# Patient Record
Sex: FEMALE | Race: WHITE | NOT HISPANIC OR LATINO | Employment: PART TIME | URBAN - METROPOLITAN AREA
[De-identification: names, ages, dates, MRNs, and addresses within clinical notes are randomized per-mention and may not be internally consistent; named-entity substitution may affect disease eponyms.]

---

## 2021-04-29 ENCOUNTER — OFFICE VISIT (OUTPATIENT)
Dept: URGENT CARE | Facility: URGENT CARE | Age: 36
End: 2021-04-29
Payer: COMMERCIAL

## 2021-04-29 VITALS
TEMPERATURE: 99.5 F | WEIGHT: 158 LBS | DIASTOLIC BLOOD PRESSURE: 78 MMHG | RESPIRATION RATE: 16 BRPM | OXYGEN SATURATION: 97 % | SYSTOLIC BLOOD PRESSURE: 122 MMHG | BODY MASS INDEX: 24.75 KG/M2 | HEART RATE: 88 BPM
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
## 2021-04-29 DIAGNOSIS — Z23 ENCOUNTER FOR IMMUNIZATION: ICD-10-CM

## 2021-04-29 DIAGNOSIS — S01.81XA FACIAL LACERATION, INITIAL ENCOUNTER: ICD-10-CM

## 2021-04-29 PROCEDURE — 90471 IMMUNIZATION ADMIN: CPT

## 2021-04-29 PROCEDURE — 12011 RPR F/E/E/N/L/M 2.5 CM/<: CPT

## 2021-04-29 PROCEDURE — 90715 TDAP VACCINE 7 YRS/> IM: CPT

## 2021-04-29 NOTE — LETTER
April 29, 2021      Shannan Vasquez Katy  [address]  [place] [address]        To Whom It May Concern:    Shannan VasquezKaty  was seen on 4/29/2021.  Please excuse her on 4/29/21 due to injury.        Sincerely,        Dr. Uche Root  Provider

## 2021-04-29 NOTE — PROGRESS NOTES
SUBJECTIVE:   36 year old female sustained laceration of face 1 hours ago. Nature of injury: bit by her dog. Tetanus vaccination status reviewed: Tdap vaccination indicated and given today.    Rabies utd.     OBJECTIVE:   Patient appears well, vitals are normal. Laceration 1 cm noted. Curved lesion about 2cm in size with the lower part gaping to sub q fat and the upper pat very superficial. 2 other smaller lesion appear well approximated. These are about 0.5cm and about 4mm.    ASSESSMENT:   Laceration as described.    PLAN:   Anesthesia with 1% Lidocaine with Epinephrine. Wound cleansed, debrided of visible foreign material and necrotic tissue, and sutured with 6 6.0 suturues. . Antibiotic ointment and dressing applied.  Wound care instructions provided.  Observe for any signs of infection or other problems.  Return for suture removal in 5 days.     Given it was a dog bite and we sutured this up we did provider 5 days of oral antibiotics.

## 2021-05-01 ENCOUNTER — OFFICE VISIT (OUTPATIENT)
Dept: URGENT CARE | Facility: URGENT CARE | Age: 36
End: 2021-05-01
Payer: COMMERCIAL

## 2021-05-01 VITALS
SYSTOLIC BLOOD PRESSURE: 141 MMHG | HEART RATE: 84 BPM | OXYGEN SATURATION: 96 % | DIASTOLIC BLOOD PRESSURE: 84 MMHG | TEMPERATURE: 98.9 F | RESPIRATION RATE: 16 BRPM
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
## 2021-05-01 DIAGNOSIS — S01.85XD DOG BITE OF FACE, SUBSEQUENT ENCOUNTER: Primary | ICD-10-CM

## 2021-05-01 PROCEDURE — 99212 OFFICE O/P EST SF 10 MIN: CPT | Performed by: PHYSICIAN ASSISTANT

## 2021-05-01 NOTE — LETTER
[facility] [address] [place] [address]  Phone: [phone]    May 1, 2021        Shannan Burns  [address] [place] [address]          To whom it may concern:    RE: Shannan Burns    Patient was seen and treated today at our clinic. Please excuse absences on 5/1, 5/2, 5/3/21.    Please contact me for questions or concerns.      Sincerely,        Taiwo Christianson PA-C

## 2021-05-01 NOTE — LETTER
[facility] [address] [place] [address]  Phone: [phone]    May 1, 2021        Shannan Burns  [address] [place] [address]          To whom it may concern:    RE: Shannan Burns    Patient was seen and treated today at our clinic.  Please excuse absences on 5/1, 5/2/21.    Please contact me for questions or concerns.      Sincerely,        Taiwo Christianson PA-C

## 2021-05-01 NOTE — PROGRESS NOTES
SUBJECTIVE:  Shannan Burns is a 36 year old female who presents to the clinic today for persistent swelling of cheek despite 36 hours AB.  Symptoms have improved mildly, no worsening.    No past medical history on file.  Current Outpatient Medications   Medication Sig Dispense Refill     amoxicillin-clavulanate (AUGMENTIN) 875-125 MG tablet Take 1 tablet by mouth 2 times daily 10 tablet 0     Social History     Tobacco Use     Smoking status: Never Smoker     Smokeless tobacco: Never Used   Substance Use Topics     Alcohol use: Yes     Comment: socially       ROS:  10 point ROS negative except as listed above      EXAM:   BP (!) 141/84   Pulse 84   Temp 98.9  F (37.2  C) (Tympanic)   Resp 16   LMP 04/14/2021 (Approximate)   SpO2 96%   GENERAL: alert, no acute distress.  SKIN: mild swelling and tenderness in cheek area.  Sutures look to be healing without infection.  EYES: , conjunctiva clear  NECK: supple, non-tender to palpation, no adenopathy noted  RESP: lungs clear to auscultation - no rales, rhonchi or wheezes  CV: regular rates and rhythm, normal S1 S2, no murmur noted  NEURO: Normal strength and tone, sensory exam grossly normal,  normal speech and mentation    ASSESSMENT:  (S01.85XD,  W54.0XXD) Dog bite of face, subsequent encounter  (primary encounter diagnosis)  Comment:  Plan: Given <48 hours AB, home monitoring acceptable. advised close follow up with any worsening of symptoms    There are no Patient Instructions on file for this visit.

## 2021-05-04 ENCOUNTER — ALLIED HEALTH/NURSE VISIT (OUTPATIENT)
Dept: NURSING | Facility: CLINIC | Age: 36
End: 2021-05-04
Payer: COMMERCIAL

## 2021-05-04 DIAGNOSIS — Z48.02 ENCOUNTER FOR REMOVAL OF SUTURES: Primary | ICD-10-CM

## 2021-05-04 DIAGNOSIS — Z48.02 VISIT FOR SUTURE REMOVAL: ICD-10-CM

## 2021-05-04 PROCEDURE — 99207 PR NO CHARGE NURSE ONLY: CPT

## 2021-05-04 NOTE — NURSING NOTE
Shannan Burns presents to the clinic for removal of sutures and sutures,staples, steri strips. The patient has had sutures in place for 5 days. There has been no patient reported signs or symptoms of infection or drainage. 6  sutures and sutures,staples, staple, steri strips are seen and located on the left cheek by mouth. Tetanus status is up to date. All sutures and sutures,staples, steri strips were easily removed today. No pain to area. Pt finished 5 day course of antibiotic last night. Cheek and jaw is still slightly puffy. I did have Dr. Joseph assess. Ok to remove suture. Well approximated healing of bite. I did place 3 steristrips to site. Routine wound care discussed by the RN or provider. The patient will follow up as needed.  BALTAZAR Bright

## 2021-05-31 ENCOUNTER — RECORDS - HEALTHEAST (OUTPATIENT)
Dept: ADMINISTRATIVE | Facility: CLINIC | Age: 36
End: 2021-05-31

## 2022-07-28 ENCOUNTER — OFFICE VISIT (OUTPATIENT)
Dept: OPHTHALMOLOGY | Facility: CLINIC | Age: 37
End: 2022-07-28
Attending: OPHTHALMOLOGY
Payer: COMMERCIAL

## 2022-07-28 DIAGNOSIS — H50.012 MONOCULAR ESOTROPIA OF LEFT EYE: Primary | ICD-10-CM

## 2022-07-28 DIAGNOSIS — H53.002 AMBLYOPIA, LEFT EYE: ICD-10-CM

## 2022-07-28 DIAGNOSIS — H53.2 DIPLOPIA: ICD-10-CM

## 2022-07-28 PROCEDURE — 92060 SENSORIMOTOR EXAMINATION: CPT | Performed by: OPHTHALMOLOGY

## 2022-07-28 PROCEDURE — G0463 HOSPITAL OUTPT CLINIC VISIT: HCPCS | Mod: 25 | Performed by: TECHNICIAN/TECHNOLOGIST

## 2022-07-28 PROCEDURE — 92015 DETERMINE REFRACTIVE STATE: CPT | Performed by: TECHNICIAN/TECHNOLOGIST

## 2022-07-28 PROCEDURE — 92004 COMPRE OPH EXAM NEW PT 1/>: CPT | Performed by: OPHTHALMOLOGY

## 2022-07-28 ASSESSMENT — CONF VISUAL FIELD
OD_NORMAL: 1
METHOD: TOYS
OS_NORMAL: 1

## 2022-07-28 ASSESSMENT — REFRACTION
OS_CYLINDER: +0.25
OS_AXIS: 180
OD_SPHERE: +0.50
OD_CYLINDER: SPHERE
OS_SPHERE: +0.75

## 2022-07-28 ASSESSMENT — VISUAL ACUITY
OS_SC+: -2
OS_SC: 20/50
METHOD: SNELLEN - LINEAR
OD_SC: 20/20

## 2022-07-28 ASSESSMENT — TONOMETRY
IOP_METHOD: SINGLE ICARE
OD_IOP_MMHG: 17
OS_IOP_MMHG: 20

## 2022-07-28 NOTE — NURSING NOTE
"Chief Complaint(s) and History of Present Illness(es)     Esotropia Evaluation     Laterality: left eye    Onset: present since childhood    Frequency: constantly    Treatments tried: glasses              Comments     H/o \"left lazy eye\" since childhood, tried glasses in childhood but did not help, blurred VA in LE, no h/o patching, unsure if LET in childhood, no h/o strab sx, notes in the past few weeks experiencing double vision and LET, notes diplopia most of the time, worse outside, wearing blue light glasses to try to help with comfort    Referred from Dr. Garcia in June 2022  Inf patient                  "

## 2022-07-29 ASSESSMENT — EXTERNAL EXAM - RIGHT EYE: OD_EXAM: NORMAL

## 2022-07-29 ASSESSMENT — SLIT LAMP EXAM - LIDS
COMMENTS: NORMAL
COMMENTS: NORMAL

## 2022-07-29 ASSESSMENT — CUP TO DISC RATIO
OD_RATIO: 0.3
OS_RATIO: 0.3

## 2022-07-29 ASSESSMENT — EXTERNAL EXAM - LEFT EYE: OS_EXAM: NORMAL

## 2022-07-30 NOTE — PROGRESS NOTES
"Chief Complaints and History of Present Illnesses   Patient presents with     Esotropia Evaluation   Review of systems for the eyes was negative other than the pertinent positives and negatives noted in the HPI.  History is obtained from the patient     Referring provider: Mar Garcia     Primary care: Yazmin Jarquin   Assessment   Shannan Burns is a 37 year old female who presents with:       ICD-10-CM    1. Monocular esotropia of left eye  H50.012 Sensorimotor   2. Amblyopia, left eye  H53.002          Plan  Ms. Burns has slowly increasing esotropia since childhood and now has bothersome diplopia (likely out of suppression scotoma)  She has mild amblyopia left eye.  I recommend eye muscle surgery. Today with Shannan,  I reviewed the indications, risks, benefits, and alternatives of eye muscle surgery including, but not limited to, failure obtain the desired ocular alignment (\"over\" or \"under\" correction), diplopia, and damage to any structure in or around the eye that may necessitate treatment with medicine, laser, or surgery. I further explained that the goal of surgery is to help control Shannan's strabismus. Surgery will not \"cure\" Shannan's strabismus or resolve/prevent the need for refractive correction. Additional strabismus surgery may be required in the short or long term. I emphasized that regular follow-up to monitor and optimize her vision and alignment would be necessary. We also discussed the risks of surgical injury, bleeding, and infection which may necessitate further medical or surgical treatment and which may result in diplopia, loss of vision, blindness, or loss of the eye(s) in less than 1% of cases and the remote possibility of permanent damage to any organ system or death with the use of general anesthesia.  I explained that we would hide visible scars as much as possible in natural creases but that every patient heals and pigments differently resulting in a variable degree of scarring " "to the eyes or surrounding facial structures after surgery.  I provided multiple opportunities for questions, answered all questions to the best of my ability, and confirmed that my answers and my discussion were understood.       Further details of the management plan can be found in the \"Patient Instructions\" section which was printed and given to the patient at checkout.  No follow-ups on file.   Attending Physician Attestation:  Complete documentation of historical and exam elements from today's encounter can be found in the full encounter summary report (not reduplicated in this progress note).  I personally obtained the chief complaint(s) and history of present illness.  I confirmed and edited as necessary the review of systems, past medical/surgical history, family history, social history, and examination findings as documented by others; and I examined the patient myself.  I personally reviewed the relevant tests, images, and reports as documented above.  I formulated and edited as necessary the assessment and plan and discussed the findings and management plan with the patient and family. - Kourtney Amor MD 7/29/2022 11:41 PM        "

## 2022-08-01 ENCOUNTER — TELEPHONE (OUTPATIENT)
Dept: OPHTHALMOLOGY | Facility: CLINIC | Age: 37
End: 2022-08-01

## 2022-09-07 ENCOUNTER — OFFICE VISIT (OUTPATIENT)
Dept: OPHTHALMOLOGY | Facility: CLINIC | Age: 37
End: 2022-09-07
Attending: OPHTHALMOLOGY
Payer: COMMERCIAL

## 2022-09-07 ENCOUNTER — OFFICE VISIT (OUTPATIENT)
Dept: FAMILY MEDICINE | Facility: CLINIC | Age: 37
End: 2022-09-07

## 2022-09-07 VITALS
RESPIRATION RATE: 16 BRPM | TEMPERATURE: 98.3 F | HEART RATE: 77 BPM | DIASTOLIC BLOOD PRESSURE: 84 MMHG | BODY MASS INDEX: 27.25 KG/M2 | OXYGEN SATURATION: 94 % | SYSTOLIC BLOOD PRESSURE: 136 MMHG | WEIGHT: 174 LBS

## 2022-09-07 DIAGNOSIS — H53.2 DIPLOPIA: ICD-10-CM

## 2022-09-07 DIAGNOSIS — Z01.818 PREOP GENERAL PHYSICAL EXAM: Primary | ICD-10-CM

## 2022-09-07 DIAGNOSIS — H50.012 MONOCULAR ESOTROPIA OF LEFT EYE: ICD-10-CM

## 2022-09-07 DIAGNOSIS — H50.012 MONOCULAR ESOTROPIA OF LEFT EYE: Primary | ICD-10-CM

## 2022-09-07 PROCEDURE — 99207 SENSORIMOTOR: CPT | Mod: 26 | Performed by: OPHTHALMOLOGY

## 2022-09-07 PROCEDURE — 99214 OFFICE O/P EST MOD 30 MIN: CPT | Performed by: NURSE PRACTITIONER

## 2022-09-07 PROCEDURE — 92285 EXTERNAL OCULAR PHOTOGRAPHY: CPT

## 2022-09-07 PROCEDURE — 92060 SENSORIMOTOR EXAMINATION: CPT

## 2022-09-07 ASSESSMENT — VISUAL ACUITY
OS_SC+: +2
OS_SC: 20/60
OD_SC+: -1
METHOD: SNELLEN - LINEAR
OD_SC: 20/20

## 2022-09-07 ASSESSMENT — CONF VISUAL FIELD
OD_NORMAL: 1
OS_NORMAL: 1

## 2022-09-07 NOTE — PROGRESS NOTES
Chief Complaint(s) & History of Present Illness  Chief Complaint(s) and History of Present Illness(es)     Esotropia Follow Up     Laterality: left eye    Onset: present since childhood    Associated symptoms: Negative for droopy eyelid, unequal pupil size and headaches    Treatments tried: glasses and patching    Comments: Double vision has gotten better since lv. Still very light sens. VA stable. Still noticing crossing of the left eye.               Comments     Inf: pt              Inf: patient       Assessment and Plan:      Shannan Burns is a 37 year old female who presents with:     Monocular esotropia of left eye  Better by examination and history. Shannan describes bright lights as being bothersome and triggering diplopia. Feels like lights have been bothering her way more now than in the past. Diplopia was happening 90% of time around last visit's time, now it happens around 40% of time.  - Sensorimotor  - External Photos [employer]       PLAN:  Will discuss with Dr Amor

## 2022-09-07 NOTE — PATIENT INSTRUCTIONS
Preparing for Your Surgery  Getting started  A nurse will call you to review your health history and instructions. They will give you an arrival time based on your scheduled surgery time. Please be ready to share:  Your doctor's clinic name and phone number  Your medical, surgical and anesthesia history  A list of allergies and sensitivities  A list of medicines, including herbal treatments and over-the-counter drugs  Whether the patient has a legal guardian (ask how to send us the papers in advance)  Please tell us if you're pregnant--or if there's any chance you might be pregnant. Some surgeries may injure a fetus (unborn baby), so they require a pregnancy test. Surgeries that are safe for a fetus don't always need a test, and you can choose whether to have one.   If you have a child who's having surgery, please ask for a copy of Preparing for Your Child's Surgery.    Preparing for surgery  Within 30 days of surgery: Have a pre-op exam (sometimes called an H&P, or History and Physical). This can be done at a clinic or pre-operative center.  If you're having a , you may not need this exam. Talk to your care team.  At your pre-op exam, talk to your care team about all medicines you take. If you need to stop any medicines before surgery, ask when to start taking them again.  We do this for your safety. Many medicines can make you bleed too much during surgery. Some change how well surgery (anesthesia) drugs work.  Call your insurance company to let them know you're having surgery. (If you don't have insurance, call [phone].)  Call your clinic if there's any change in your health. This includes signs of a cold or flu (sore throat, runny nose, cough, rash, fever). It also includes a scrape or scratch near the surgery site.  If you have questions on the day of surgery, call your hospital or surgery center.  COVID testing  You may need to be tested for COVID-19 before having surgery. If so, we will give  you instructions.  Eating and drinking guidelines  For your safety: Unless your surgeon tells you otherwise, follow the guidelines below.  Eat and drink as usual until 8 hours before surgery. After that, no food or milk.  Drink clear liquids until 2 hours before surgery. These are liquids you can see through, like water, Gatorade and Propel Water. You may also have black coffee and tea (no cream or milk).  Nothing by mouth within 2 hours of surgery. This includes gum, candy and breath mints.  If you drink alcohol: Stop drinking it the night before surgery.  If your care team tells you to take medicine on the morning of surgery, it's okay to take it with a sip of water.  Preventing infection  Shower or bathe the night before and morning of your surgery. Follow the instructions your clinic gave you. (If no instructions, use regular soap.)  Don't shave or clip hair near your surgery site. We'll remove the hair if needed.  Don't smoke or vape the morning of surgery. You may chew nicotine gum up to 2 hours before surgery. A nicotine patch is okay.  Note: Some surgeries require you to completely quit smoking and nicotine. Check with your surgeon.  Your care team will make every effort to keep you safe from infection. We will:  Clean our hands often with soap and water (or an alcohol-based hand rub).  Clean the skin at your surgery site with a special soap that kills germs.  Give you a special gown to keep you warm. (Cold raises the risk of infection.)  Wear special hair covers, masks, gowns and gloves during surgery.  Give antibiotic medicine, if prescribed. Not all surgeries need antibiotics.  What to bring on the day of surgery  Photo ID and insurance card  Copy of your health care directive, if you have one  Glasses and hearing aides (bring cases)  You can't wear contacts during surgery  Inhaler and eye drops, if you use them (tell us about these when you arrive)  CPAP machine or breathing device, if you use them  A  few personal items, if spending the night  If you have . . .  A pacemaker, ICD (cardiac defibrillator) or other implant: Bring the ID card.  An implanted stimulator: Bring the remote control.  A legal guardian: Bring a copy of the certified (court-stamped) guardianship papers.  Please remove any jewelry, including body piercings. Leave jewelry and other valuables at home.  If you're going home the day of surgery  You must have a responsible adult drive you home. They should stay with you overnight as well.  If you don't have someone to stay with you, and you aren't safe to go home alone, we may keep you overnight. Insurance often won't pay for this.  After surgery  If it's hard to control your pain or you need more pain medicine, please call your surgeon's office.  Questions?   If you have any questions for your care team, list them here: _________________________________________________________________________________________________________________________________________________________________________ ____________________________________ ____________________________________ ____________________________________  For informational purposes only. Not to replace the advice of your health care provider. Copyright   2003, 2019 [employer]. All rights reserved. Clinically reviewed by Marine Ojeda MD. [employer] - SHV [id number] - REV 07/21.    Preparing for Your Surgery  Getting started  A nurse will call you to review your health history and instructions. They will give you an arrival time based on your scheduled surgery time. Please be ready to share:  Your doctor's clinic name and phone number  Your medical, surgical and anesthesia history  A list of allergies and sensitivities  A list of medicines, including herbal treatments and over-the-counter drugs  Whether the patient has a legal guardian (ask how to send us the papers in advance)  Please tell us if you're pregnant--or if there's any chance you might be  pregnant. Some surgeries may injure a fetus (unborn baby), so they require a pregnancy test. Surgeries that are safe for a fetus don't always need a test, and you can choose whether to have one.   If you have a child who's having surgery, please ask for a copy of Preparing for Your Child's Surgery.    Preparing for surgery  Within 30 days of surgery: Have a pre-op exam (sometimes called an H&P, or History and Physical). This can be done at a clinic or pre-operative center.  If you're having a , you may not need this exam. Talk to your care team.  At your pre-op exam, talk to your care team about all medicines you take. If you need to stop any medicines before surgery, ask when to start taking them again.  We do this for your safety. Many medicines can make you bleed too much during surgery. Some change how well surgery (anesthesia) drugs work.  Call your insurance company to let them know you're having surgery. (If you don't have insurance, call [phone].)  Call your clinic if there's any change in your health. This includes signs of a cold or flu (sore throat, runny nose, cough, rash, fever). It also includes a scrape or scratch near the surgery site.  If you have questions on the day of surgery, call your hospital or surgery center.  COVID testing  You may need to be tested for COVID-19 before having surgery. If so, we will give you instructions.  Eating and drinking guidelines  For your safety: Unless your surgeon tells you otherwise, follow the guidelines below.  Eat and drink as usual until 8 hours before surgery. After that, no food or milk.  Drink clear liquids until 2 hours before surgery. These are liquids you can see through, like water, Gatorade and Propel Water. You may also have black coffee and tea (no cream or milk).  Nothing by mouth within 2 hours of surgery. This includes gum, candy and breath mints.  If you drink alcohol: Stop drinking it the night before surgery.  If your care team  tells you to take medicine on the morning of surgery, it's okay to take it with a sip of water.  Preventing infection  Shower or bathe the night before and morning of your surgery. Follow the instructions your clinic gave you. (If no instructions, use regular soap.)  Don't shave or clip hair near your surgery site. We'll remove the hair if needed.  Don't smoke or vape the morning of surgery. You may chew nicotine gum up to 2 hours before surgery. A nicotine patch is okay.  Note: Some surgeries require you to completely quit smoking and nicotine. Check with your surgeon.  Your care team will make every effort to keep you safe from infection. We will:  Clean our hands often with soap and water (or an alcohol-based hand rub).  Clean the skin at your surgery site with a special soap that kills germs.  Give you a special gown to keep you warm. (Cold raises the risk of infection.)  Wear special hair covers, masks, gowns and gloves during surgery.  Give antibiotic medicine, if prescribed. Not all surgeries need antibiotics.  What to bring on the day of surgery  Photo ID and insurance card  Copy of your health care directive, if you have one  Glasses and hearing aides (bring cases)  You can't wear contacts during surgery  Inhaler and eye drops, if you use them (tell us about these when you arrive)  CPAP machine or breathing device, if you use them  A few personal items, if spending the night  If you have . . .  A pacemaker, ICD (cardiac defibrillator) or other implant: Bring the ID card.  An implanted stimulator: Bring the remote control.  A legal guardian: Bring a copy of the certified (court-stamped) guardianship papers.  Please remove any jewelry, including body piercings. Leave jewelry and other valuables at home.  If you're going home the day of surgery  You must have a responsible adult drive you home. They should stay with you overnight as well.  If you don't have someone to stay with you, and you aren't safe to go  home alone, we may keep you overnight. Insurance often won't pay for this.  After surgery  If it's hard to control your pain or you need more pain medicine, please call your surgeon's office.  Questions?   If you have any questions for your care team, list them here: _________________________________________________________________________________________________________________________________________________________________________ ____________________________________ ____________________________________ ____________________________________  For informational purposes only. Not to replace the advice of your health care provider. Copyright   2003, 2019 [facility]. All rights reserved. Clinically reviewed by Marine Ojeda MD. [employer] [id number] - REV 07/21.    How to Take Your Medication Before Surgery  - HOLD (do not take) no ibuprofen/aleve, advil or aspirin 1 week before surgery

## 2022-09-07 NOTE — NURSING NOTE
Chief Complaint(s) and History of Present Illness(es)     Esotropia Follow Up     Laterality: left eye    Onset: present since childhood    Associated symptoms: Negative for droopy eyelid, unequal pupil size and headaches    Treatments tried: glasses and patching    Comments: Double vision has gotten better since lv. Still very light sens. VA stable. Still noticing crossing of the left eye.               Comments     Inf: pt              
Pending TSH, Free T4, T3

## 2022-09-07 NOTE — PROGRESS NOTES
. [facility] [address] [place] [address]  Phone: [phone]  Primary Provider: Yazmin Jarquin  Pre-op Performing Provider: NICOLLE PHIPPS      PREOPERATIVE EVALUATION:  Today's date: 9/7/2022    Shannan Burns is a 37 year old female who presents for a preoperative evaluation.    Surgical Information:  Surgery/Procedure: SIMPLE RECESSION OR RESECTION, MUSCLE, EXTRAOCULAR, BILATERAL, FOR STRABISMUS CORRECTION  Surgery Location:  OR  Surgeon: Kourtney Amor MD  Surgery Date: 09/14/2022  Time of Surgery: 2:45 PM   Where patient plans to recover: At home with family  Fax number for surgical facility: Note does not need to be faxed, will be available electronically in Epic.    Type of Anesthesia Anticipated: General    Assessment & Plan     The proposed surgical procedure is considered LOW risk.    Preop general physical exam  preoperative examination wnl; no concerning health issues identified prior to surgery; reviewed cardiac risk, chronic health history, medications and discussed presurgical medication management. Patient deemed medically stable for planned surgery.  Plan: will check the following   - Basic metabolic panel  - CBC with platelets  - Asymptomatic COVID-19 Virus (Coronavirus) by PCR  - HCG qualitative urine    Monocular esotropia of left eye  Diplopia  Present since childhood w/o associated symptoms; failed conservative therapy include glasses and patching  - recommend surgical correction     Risks and Recommendations:  The patient has the following additional risks and recommendations for perioperative complications:   - No identified additional risk factors other than previously addressed    Medication Instructions:  Patient is on no chronic medications    RECOMMENDATION:  APPROVAL GIVEN to proceed with proposed procedure, without further diagnostic evaluation.      37 year old  year old female with PMH   Patient Active Problem List    Diagnosis Code     Acute sinusitis J01.90     Neck Sprain On Right Side S13.9XXA     in clinic for preoperative examination for upcoming procedure  SIMPLE RECESSION OR RESECTION, MUSCLE, EXTRAOCULAR, BILATERAL, FOR STRABISMUS CORRECTION left eye. Disease is present since childhood. Associated symptoms include Negative for droopy eyelid, unequal pupil size and headaches. Treatments tried include glasses and patching. Additional comments: Double vision has gotten better since lv. Still very light sens. VA stable. Still noticing crossing of the left eye.    Patient followed by Ophthalmology last visit  9/7/2022:    Laterality: left eye     Onset: present since childhood     Associated symptoms: Negative for droopy eyelid, unequal pupil size and headaches     Treatments tried: glasses and patching     Comments: Double vision has gotten better since lv. Still very light sens. VA stable. Still noticing crossing of the left eye.       Review of all chronic health conditions, medications, smoking history and COVID status completed to ensure patient medically stable for surgery.       Subjective     HPI related to upcoming procedure:   Preop Questions 9/7/2022   1. Have you ever had a heart attack or stroke? No   2. Have you ever had surgery on your heart or blood vessels, such as a stent placement, a coronary artery bypass, or surgery on an artery in your head, neck, heart, or legs? No   3. Do you have chest pain with activity? No   4. Do you have a history of  heart failure? No   5. Do you currently have a cold, bronchitis or symptoms of other infection? No   6. Do you have a cough, shortness of breath, or wheezing? No   7. Do you or anyone in your family have previous history of blood clots? No   8. Do you or does anyone in your family have a serious bleeding problem such as prolonged bleeding following surgeries or cuts? No   9. Have you ever had problems with anemia or been told to take iron pills? No   10. Have you  had any abnormal blood loss such as black, tarry or bloody stools, or abnormal vaginal bleeding? No   11. Have you ever had a blood transfusion? No   12. Are you willing to have a blood transfusion if it is medically needed before, during, or after your surgery? Yes   13. Have you or any of your relatives ever had problems with anesthesia? No   14. Do you have sleep apnea, excessive snoring or daytime drowsiness? No   15. Do you have any artifical heart valves or other implanted medical devices like a pacemaker, defibrillator, or continuous glucose monitor? No   16. Do you have artificial joints? No   17. Are you allergic to latex? No   18. Is there any chance that you may be pregnant? No       Health Care Directive:  Patient does not have a Health Care Directive or Living Will: Discussed advance care planning with patient; information given to patient to review.    Preoperative Review of :   reviewed - no record of controlled substances prescribed.      Status of Chronic Conditions:  See problem list for active medical problems.  Problems all longstanding and stable, except as noted/documented.  See ROS for pertinent symptoms related to these conditions.      Review of Systems  CONSTITUTIONAL: NEGATIVE for fever, chills, change in weight  INTEGUMENTARY/SKIN: NEGATIVE for worrisome rashes, moles or lesions  EYES: NEGATIVE for vision changes or irritation  ENT/MOUTH: NEGATIVE for ear, mouth and throat problems  RESP: NEGATIVE for significant cough or SOB  CV: NEGATIVE for chest pain, palpitations or peripheral edema  GI: NEGATIVE for nausea, abdominal pain, heartburn, or change in bowel habits  : NEGATIVE for frequency, dysuria, or hematuria  MUSCULOSKELETAL: NEGATIVE for significant arthralgias or myalgia  NEURO: NEGATIVE for weakness, dizziness or paresthesias  ENDOCRINE: NEGATIVE for temperature intolerance, skin/hair changes  HEME: NEGATIVE for bleeding problems  PSYCHIATRIC: NEGATIVE for changes in mood  or affect    Patient Active Problem List    Diagnosis Date Noted     Neck Sprain On Right Side      Priority: Medium     Created by Conversion      Formatting of this note might be different from the original.  Created by Conversion       Acute sinusitis      Priority: Medium     Created by Conversion      Formatting of this note might be different from the original.  Created by Conversion        Past Medical History:   Diagnosis Date     Strabismus      History reviewed. No pertinent surgical history.  Current Outpatient Medications   Medication Sig Dispense Refill     ibuprofen (ADVIL/MOTRIN) 200 MG tablet Take 200-400 mg by mouth         No Known Allergies     Social History     Tobacco Use     Smoking status: Never Smoker     Smokeless tobacco: Never Used   Substance Use Topics     Alcohol use: Yes     Comment: socially     History reviewed. No pertinent family history.  History   Drug Use No         Objective     /84 (BP Location: Right arm, Patient Position: Sitting, Cuff Size: Adult Regular)   Pulse 77   Temp 98.3  F (36.8  C) (Temporal)   Resp 16   Wt 78.9 kg (174 lb)   LMP 08/08/2022   SpO2 94%   BMI 27.25 kg/m      Physical Exam    GENERAL APPEARANCE: healthy, alert and no distress     EYES: EOMI, PERRL     HENT: ear canals and TM's normal and nose and mouth without ulcers or lesions     NECK: no adenopathy, no asymmetry, masses, or scars and thyroid normal to palpation     RESP: lungs clear to auscultation - no rales, rhonchi or wheezes     CV: regular rates and rhythm, normal S1 S2, no S3 or S4 and no murmur, click or rub     ABDOMEN:  soft, nontender, no HSM or masses and bowel sounds normal     MS: extremities normal- no gross deformities noted, no evidence of inflammation in joints, FROM in all extremities.     SKIN: no suspicious lesions or rashes     NEURO: Normal strength and tone, sensory exam grossly normal, mentation intact and speech normal     PSYCH: mentation appears normal. and  affect normal/bright     LYMPHATICS: No cervical adenopathy    No results for input(s): HGB, PLT, INR, NA, POTASSIUM, CR, A1C in the last 65899 hours.     Diagnostics:  Labs pending at this time.  Results will be reviewed when available.   No EKG required, no history of coronary heart disease, significant arrhythmia, peripheral arterial disease or other structural heart disease.    Revised Cardiac Risk Index (RCRI):  The patient has the following serious cardiovascular risks for perioperative complications:   - No serious cardiac risks = 0 points     RCRI Interpretation: 0 points: Class I (very low risk - 0.4% complication rate)           Signed Electronically by: DALILA Conn CNP  Copy of this evaluation report is provided to requesting physician.

## 2022-09-08 RX ORDER — IBUPROFEN 200 MG
200-400 TABLET ORAL
COMMUNITY

## 2022-09-09 ENCOUNTER — TELEPHONE (OUTPATIENT)
Dept: OPHTHALMOLOGY | Facility: CLINIC | Age: 37
End: 2022-09-09

## 2022-09-09 NOTE — TELEPHONE ENCOUNTER
[facility]    Phone Message    May a detailed message be left on voicemail: yes     Reason for Call: Other: Follow Up     Action Taken: Other: Peds Eye    Travel Screening: Not Applicable    Patient Shannan was calling to follow up regarding my chart message sent, would like to know status if Dr. Amor have been able to review on moving forward with surgery. Please call patient back at [phone] sending as high priority per patient request.

## 2022-09-09 NOTE — TELEPHONE ENCOUNTER
Spoke with patient, let her know that I have forwarded her My Chart message on to Dr. Amor, however she isn't back in clinic until Tuesday 9/13. I let patient know I will follow up with Dr. Amor Tuesday AM regarding her questions.     Cass Brewer, COT

## 2022-09-10 ENCOUNTER — HEALTH MAINTENANCE LETTER (OUTPATIENT)
Age: 37
End: 2022-09-10

## 2022-09-11 ENCOUNTER — ANESTHESIA EVENT (OUTPATIENT)
Dept: SURGERY | Facility: CLINIC | Age: 37
End: 2022-09-11
Payer: COMMERCIAL

## 2022-09-11 ENCOUNTER — LAB (OUTPATIENT)
Dept: LAB | Facility: CLINIC | Age: 37
End: 2022-09-11
Payer: COMMERCIAL

## 2022-09-11 DIAGNOSIS — Z01.818 PREOP GENERAL PHYSICAL EXAM: ICD-10-CM

## 2022-09-11 LAB
ANION GAP SERPL CALCULATED.3IONS-SCNC: 4 MMOL/L (ref 3–14)
BUN SERPL-MCNC: 14 MG/DL (ref 7–30)
CALCIUM SERPL-MCNC: 9 MG/DL (ref 8.5–10.1)
CHLORIDE BLD-SCNC: 108 MMOL/L (ref 94–109)
CO2 SERPL-SCNC: 26 MMOL/L (ref 20–32)
CREAT SERPL-MCNC: 0.75 MG/DL (ref 0.52–1.04)
ERYTHROCYTE [DISTWIDTH] IN BLOOD BY AUTOMATED COUNT: 11.5 % (ref 10–15)
GFR SERPL CREATININE-BSD FRML MDRD: >90 ML/MIN/1.73M2
GLUCOSE BLD-MCNC: 95 MG/DL (ref 70–99)
HCG UR QL: NEGATIVE
HCT VFR BLD AUTO: 42 % (ref 35–47)
HGB BLD-MCNC: 14.5 G/DL (ref 11.7–15.7)
MCH RBC QN AUTO: 30.3 PG (ref 26.5–33)
MCHC RBC AUTO-ENTMCNC: 34.5 G/DL (ref 31.5–36.5)
MCV RBC AUTO: 88 FL (ref 78–100)
PLATELET # BLD AUTO: 355 10E3/UL (ref 150–450)
POTASSIUM BLD-SCNC: 4.4 MMOL/L (ref 3.4–5.3)
RBC # BLD AUTO: 4.79 10E6/UL (ref 3.8–5.2)
SODIUM SERPL-SCNC: 138 MMOL/L (ref 133–144)
WBC # BLD AUTO: 8.2 10E3/UL (ref 4–11)

## 2022-09-11 PROCEDURE — 81025 URINE PREGNANCY TEST: CPT

## 2022-09-11 PROCEDURE — U0005 INFEC AGEN DETEC AMPLI PROBE: HCPCS

## 2022-09-11 PROCEDURE — U0003 INFECTIOUS AGENT DETECTION BY NUCLEIC ACID (DNA OR RNA); SEVERE ACUTE RESPIRATORY SYNDROME CORONAVIRUS 2 (SARS-COV-2) (CORONAVIRUS DISEASE [COVID-19]), AMPLIFIED PROBE TECHNIQUE, MAKING USE OF HIGH THROUGHPUT TECHNOLOGIES AS DESCRIBED BY CMS-2020-01-R: HCPCS

## 2022-09-11 PROCEDURE — 85027 COMPLETE CBC AUTOMATED: CPT

## 2022-09-11 PROCEDURE — 80048 BASIC METABOLIC PNL TOTAL CA: CPT

## 2022-09-11 PROCEDURE — 36415 COLL VENOUS BLD VENIPUNCTURE: CPT

## 2022-09-12 LAB — SARS-COV-2 RNA RESP QL NAA+PROBE: NEGATIVE

## 2022-09-14 ENCOUNTER — ANESTHESIA (OUTPATIENT)
Dept: SURGERY | Facility: CLINIC | Age: 37
End: 2022-09-14
Payer: COMMERCIAL

## 2022-09-14 ENCOUNTER — HOSPITAL ENCOUNTER (OUTPATIENT)
Facility: CLINIC | Age: 37
Discharge: HOME | End: 2022-09-14
Attending: OPHTHALMOLOGY | Admitting: OPHTHALMOLOGY
Payer: COMMERCIAL

## 2022-09-14 VITALS
OXYGEN SATURATION: 94 % | SYSTOLIC BLOOD PRESSURE: 138 MMHG | HEIGHT: 67 IN | BODY MASS INDEX: 27.06 KG/M2 | HEART RATE: 87 BPM | TEMPERATURE: 98.6 F | WEIGHT: 172.4 LBS | DIASTOLIC BLOOD PRESSURE: 84 MMHG | RESPIRATION RATE: 29 BRPM

## 2022-09-14 DIAGNOSIS — Z98.890 POSTOPERATIVE EYE STATE: Primary | ICD-10-CM

## 2022-09-14 PROBLEM — H50.9 STRABISMUS: Status: ACTIVE | Noted: 2022-09-14

## 2022-09-14 LAB — GLUCOSE BLDC GLUCOMTR-MCNC: 112 MG/DL (ref 70–99)

## 2022-09-14 PROCEDURE — 258N000003 HC RX IP 258 OP 636

## 2022-09-14 PROCEDURE — 250N000025 HC SEVOFLURANE, PER MIN: Performed by: OPHTHALMOLOGY

## 2022-09-14 PROCEDURE — 258N000003 HC RX IP 258 OP 636: Performed by: NURSE ANESTHETIST, CERTIFIED REGISTERED

## 2022-09-14 PROCEDURE — 999N000141 HC STATISTIC PRE-PROCEDURE NURSING ASSESSMENT: Performed by: OPHTHALMOLOGY

## 2022-09-14 PROCEDURE — 250N000013 HC RX MED GY IP 250 OP 250 PS 637

## 2022-09-14 PROCEDURE — 272N000001 HC OR GENERAL SUPPLY STERILE: Performed by: OPHTHALMOLOGY

## 2022-09-14 PROCEDURE — 250N000011 HC RX IP 250 OP 636: Performed by: NURSE ANESTHETIST, CERTIFIED REGISTERED

## 2022-09-14 PROCEDURE — 710N000012 HC RECOVERY PHASE 2, PER MINUTE: Performed by: OPHTHALMOLOGY

## 2022-09-14 PROCEDURE — 67311 REVISE EYE MUSCLE: CPT | Mod: LT | Performed by: OPHTHALMOLOGY

## 2022-09-14 PROCEDURE — 82962 GLUCOSE BLOOD TEST: CPT

## 2022-09-14 PROCEDURE — 360N000076 HC SURGERY LEVEL 3, PER MIN: Performed by: OPHTHALMOLOGY

## 2022-09-14 PROCEDURE — 250N000009 HC RX 250: Performed by: OPHTHALMOLOGY

## 2022-09-14 PROCEDURE — 250N000009 HC RX 250: Performed by: NURSE ANESTHETIST, CERTIFIED REGISTERED

## 2022-09-14 PROCEDURE — 250N000011 HC RX IP 250 OP 636

## 2022-09-14 PROCEDURE — 710N000010 HC RECOVERY PHASE 1, LEVEL 2, PER MIN: Performed by: OPHTHALMOLOGY

## 2022-09-14 PROCEDURE — 370N000017 HC ANESTHESIA TECHNICAL FEE, PER MIN: Performed by: OPHTHALMOLOGY

## 2022-09-14 RX ORDER — LIDOCAINE HYDROCHLORIDE 20 MG/ML
INJECTION, SOLUTION INFILTRATION; PERINEURAL PRN
Status: DISCONTINUED | OUTPATIENT
Start: 2022-09-14 | End: 2022-09-14

## 2022-09-14 RX ORDER — OXYCODONE HYDROCHLORIDE 5 MG/1
5 TABLET ORAL EVERY 4 HOURS PRN
Status: DISCONTINUED | OUTPATIENT
Start: 2022-09-14 | End: 2022-09-14 | Stop reason: HOSPADM

## 2022-09-14 RX ORDER — OXYMETAZOLINE HYDROCHLORIDE 0.05 G/100ML
SPRAY NASAL PRN
Status: DISCONTINUED | OUTPATIENT
Start: 2022-09-14 | End: 2022-09-14 | Stop reason: HOSPADM

## 2022-09-14 RX ORDER — PROPOFOL 10 MG/ML
INJECTION, EMULSION INTRAVENOUS PRN
Status: DISCONTINUED | OUTPATIENT
Start: 2022-09-14 | End: 2022-09-14

## 2022-09-14 RX ORDER — FENTANYL CITRATE 50 UG/ML
INJECTION, SOLUTION INTRAMUSCULAR; INTRAVENOUS PRN
Status: DISCONTINUED | OUTPATIENT
Start: 2022-09-14 | End: 2022-09-14

## 2022-09-14 RX ORDER — KETOROLAC TROMETHAMINE 30 MG/ML
INJECTION, SOLUTION INTRAMUSCULAR; INTRAVENOUS PRN
Status: DISCONTINUED | OUTPATIENT
Start: 2022-09-14 | End: 2022-09-14

## 2022-09-14 RX ORDER — SODIUM CHLORIDE, SODIUM LACTATE, POTASSIUM CHLORIDE, CALCIUM CHLORIDE 600; 310; 30; 20 MG/100ML; MG/100ML; MG/100ML; MG/100ML
INJECTION, SOLUTION INTRAVENOUS CONTINUOUS PRN
Status: DISCONTINUED | OUTPATIENT
Start: 2022-09-14 | End: 2022-09-14

## 2022-09-14 RX ORDER — LIDOCAINE 40 MG/G
CREAM TOPICAL
Status: DISCONTINUED | OUTPATIENT
Start: 2022-09-14 | End: 2022-09-14 | Stop reason: HOSPADM

## 2022-09-14 RX ORDER — SODIUM CHLORIDE, SODIUM LACTATE, POTASSIUM CHLORIDE, CALCIUM CHLORIDE 600; 310; 30; 20 MG/100ML; MG/100ML; MG/100ML; MG/100ML
INJECTION, SOLUTION INTRAVENOUS CONTINUOUS
Status: DISCONTINUED | OUTPATIENT
Start: 2022-09-14 | End: 2022-09-14 | Stop reason: HOSPADM

## 2022-09-14 RX ORDER — ACETAMINOPHEN 325 MG/1
975 TABLET ORAL ONCE
Status: COMPLETED | OUTPATIENT
Start: 2022-09-14 | End: 2022-09-14

## 2022-09-14 RX ORDER — FENTANYL CITRATE 50 UG/ML
25 INJECTION, SOLUTION INTRAMUSCULAR; INTRAVENOUS EVERY 5 MIN PRN
Status: DISCONTINUED | OUTPATIENT
Start: 2022-09-14 | End: 2022-09-14 | Stop reason: HOSPADM

## 2022-09-14 RX ORDER — BALANCED SALT SOLUTION 6.4; .75; .48; .3; 3.9; 1.7 MG/ML; MG/ML; MG/ML; MG/ML; MG/ML; MG/ML
SOLUTION OPHTHALMIC PRN
Status: DISCONTINUED | OUTPATIENT
Start: 2022-09-14 | End: 2022-09-14 | Stop reason: HOSPADM

## 2022-09-14 RX ORDER — HYDROMORPHONE HCL IN WATER/PF 6 MG/30 ML
0.2 PATIENT CONTROLLED ANALGESIA SYRINGE INTRAVENOUS EVERY 5 MIN PRN
Status: DISCONTINUED | OUTPATIENT
Start: 2022-09-14 | End: 2022-09-14 | Stop reason: HOSPADM

## 2022-09-14 RX ORDER — ONDANSETRON 2 MG/ML
INJECTION INTRAMUSCULAR; INTRAVENOUS PRN
Status: DISCONTINUED | OUTPATIENT
Start: 2022-09-14 | End: 2022-09-14

## 2022-09-14 RX ORDER — DEXAMETHASONE SODIUM PHOSPHATE 4 MG/ML
INJECTION, SOLUTION INTRA-ARTICULAR; INTRALESIONAL; INTRAMUSCULAR; INTRAVENOUS; SOFT TISSUE PRN
Status: DISCONTINUED | OUTPATIENT
Start: 2022-09-14 | End: 2022-09-14

## 2022-09-14 RX ORDER — ONDANSETRON 2 MG/ML
4 INJECTION INTRAMUSCULAR; INTRAVENOUS EVERY 30 MIN PRN
Status: DISCONTINUED | OUTPATIENT
Start: 2022-09-14 | End: 2022-09-14 | Stop reason: HOSPADM

## 2022-09-14 RX ORDER — ONDANSETRON 4 MG/1
4 TABLET, ORALLY DISINTEGRATING ORAL EVERY 30 MIN PRN
Status: DISCONTINUED | OUTPATIENT
Start: 2022-09-14 | End: 2022-09-14 | Stop reason: HOSPADM

## 2022-09-14 RX ORDER — OXYCODONE HYDROCHLORIDE 5 MG/1
5 TABLET ORAL EVERY 6 HOURS PRN
Qty: 12 TABLET | Refills: 0 | Status: SHIPPED | OUTPATIENT
Start: 2022-09-14 | End: 2022-09-17

## 2022-09-14 RX ADMIN — PROPOFOL 200 MG: 10 INJECTION, EMULSION INTRAVENOUS at 15:43

## 2022-09-14 RX ADMIN — ONDANSETRON 4 MG: 4 TABLET, ORALLY DISINTEGRATING ORAL at 17:23

## 2022-09-14 RX ADMIN — LIDOCAINE HYDROCHLORIDE 80 MG: 20 INJECTION, SOLUTION INFILTRATION; PERINEURAL at 15:43

## 2022-09-14 RX ADMIN — ACETAMINOPHEN 975 MG: 325 TABLET, FILM COATED ORAL at 14:44

## 2022-09-14 RX ADMIN — FENTANYL CITRATE 25 MCG: 50 INJECTION, SOLUTION INTRAMUSCULAR; INTRAVENOUS at 16:19

## 2022-09-14 RX ADMIN — ONDANSETRON 4 MG: 2 INJECTION INTRAMUSCULAR; INTRAVENOUS at 16:25

## 2022-09-14 RX ADMIN — PROPOFOL 20 MG: 10 INJECTION, EMULSION INTRAVENOUS at 16:02

## 2022-09-14 RX ADMIN — DEXAMETHASONE SODIUM PHOSPHATE 8 MG: 4 INJECTION, SOLUTION INTRAMUSCULAR; INTRAVENOUS at 16:25

## 2022-09-14 RX ADMIN — SODIUM CHLORIDE, POTASSIUM CHLORIDE, SODIUM LACTATE AND CALCIUM CHLORIDE 100 ML/HR: 600; 310; 30; 20 INJECTION, SOLUTION INTRAVENOUS at 14:44

## 2022-09-14 RX ADMIN — MIDAZOLAM 2 MG: 1 INJECTION INTRAMUSCULAR; INTRAVENOUS at 15:39

## 2022-09-14 RX ADMIN — FENTANYL CITRATE 25 MCG: 50 INJECTION, SOLUTION INTRAMUSCULAR; INTRAVENOUS at 16:01

## 2022-09-14 RX ADMIN — PROPOFOL 20 MG: 10 INJECTION, EMULSION INTRAVENOUS at 16:01

## 2022-09-14 RX ADMIN — KETOROLAC TROMETHAMINE 30 MG: 30 INJECTION, SOLUTION INTRAMUSCULAR at 16:42

## 2022-09-14 RX ADMIN — SODIUM CHLORIDE, POTASSIUM CHLORIDE, SODIUM LACTATE AND CALCIUM CHLORIDE: 600; 310; 30; 20 INJECTION, SOLUTION INTRAVENOUS at 15:43

## 2022-09-14 ASSESSMENT — ACTIVITIES OF DAILY LIVING (ADL)
ADLS_ACUITY_SCORE: 33
ADLS_ACUITY_SCORE: 35
ADLS_ACUITY_SCORE: 35

## 2022-09-14 NOTE — DISCHARGE INSTRUCTIONS
"Instructions for after your eye muscle surgery:  Apply cool compresses, wash cloths, or ice packs (consider bags of frozen peas or corn) to eyes for 10 minutes on and 10 minutes off as tolerated for 2 days.    Acetaminophen (Tylenol) and NSAIDs (Motrin, Ibuprofen, Advil, Naproxen) may be given per the dosing instructions on the label for pain every 6 hours.  I recommend alternating these two types of medicine every 3 hours so that Shannan receives one of them for pain control every 3 hours.  (For example: acetaminophen - wait 3 hours - ibuprofen - wait 3 hours - acetaminophen - wait 3 hours - ibuprofen - etc.)      Dr. Amor's team will call you in 1 week to check in. This will be scheduled as a \"MyChart\" virtual visit, but you do not have to be at a computer or expect it to happen at the exact time. The appointment is just a reminder for our team to call you sometime that day to check in on Shannan.     Return for follow-up with Dr. Tuttle as scheduled in 3-4 months.   [place]: Thor Correa at [phone]   [place]: [phone]    What to expect and watch for:  Sensitivity to light, blurry vision, double vision, foreign body sensation (feeling like the eyes have something in them or are scratchy), aching or sore eyes especially with movement, bloodstained orange/red tears and crusting along the eyelashes are all normal after surgery. These will be the worst for the first 24-48 hours after surgery. As a result, some patients will elect to keep their eyes closed for 1-3 days after surgery. This is normal. Whenever you are comfortable, you may open your eyes.      Movies, tablets, and phones may be watched anytime. If glasses are worn, it is ok to keep them off while the eyes are resting and resume wear once the patient is comfortably opening the eyes again in a few days. If you were patching an eye prior to surgery, STOP now.     Avoid eye pressure, rubbing, straining, and athletics for 1 week. No " swimming (lakes or pools), sand, or dirt in the eyes for 2 weeks. Bathe or shower as usual.    It is normal for the white part of the eyes to be red/orange/purple and puffy or gelatinous like a gummy bear on the surface of the eye. This is just a bruise and will fade away slowly over a few weeks.     You may return to work whenever comfortable. Some patients return on the Friday and most return on the Monday following surgery.     It takes 1-2 months for the eye muscles to fully regain their strength, for the brain to figure out the new system, and for the eye alignment to normalize. During this time, you may experience double vision and some unsteadiness. After surgery, the eyes may appear to wander in any direction (in, out, up, or down). This is normal and will gradually improve each day. It is hard to wait, but trust that it will improve with time.     After the first 2 days, the eye redness, discomfort, vision, and pain should be the same or slowly better every day. It should not get worse after 48 hours.   If you experience worsening RSVP (Redness, Sensitivity to light, Vision, Pain), or if you develop a fever (temperature greater than 100.4 F) or worsening discharge or if you have any other concerns:    call [phone] (during business hours) or [phone] (after hours & weekends) and ask to speak with the Ophthalmology Resident or Fellow On-Call   OR  return to the eye clinic or emergency room immediately.     If you are unable to tolerate food and drink, vomit 3 times, or appears to have decreased alertness or lethargy, return to the emergency room immediately as these can be signs of delayed stomach wake-up after anesthesia and you may need IV fluids to prevent dehydration.    For assistance from an :  7 AM - 6 PM on Monday - Friday, and 7 AM - 4:30 PM on Saturday & Sunday: call [phone], then select option 3.  After hours: call [phone] and ask the  for   assistance.

## 2022-09-14 NOTE — ANESTHESIA POSTPROCEDURE EVALUATION
Patient: Shannan Burns    Procedure: Procedure(s):  SIMPLE RECESSION OR RESECTION, MUSCLE, EXTRAOCULAR, BILATERAL, FOR STRABISMUS CORRECTION       Anesthesia Type:  General    Note:  Disposition: Outpatient   Postop Pain Control: Uneventful            Sign Out: Well controlled pain   PONV: Yes            Symptoms: Nausea only            Sign Out: PONV/POV resolved with treatment   Neuro/Psych: Uneventful            Sign Out: Acceptable/Baseline neuro status   Airway/Respiratory: Uneventful            Sign Out: Acceptable/Baseline resp. status   CV/Hemodynamics: Uneventful            Sign Out: Acceptable CV status; No obvious hypovolemia; No obvious fluid overload   Other NRE: NONE   DID A NON-ROUTINE EVENT OCCUR? No           Last vitals:  Vitals Value Taken Time   /90 09/14/22 1745   Temp 36.8  C (98.2  F) 09/14/22 1745   Pulse 87 09/14/22 1755   Resp 8 09/14/22 1755   SpO2 96 % 09/14/22 1755   Vitals shown include unvalidated device data.    Electronically Signed By: Louis Kemp MD  September 14, 2022  6:08 PM

## 2022-09-14 NOTE — ANESTHESIA PROCEDURE NOTES
Airway       Patient location during procedure: OR  Staff -        CRNA: Ora Childress APRN CRNA       Performed By: CRNAIndications and Patient Condition       Indications for airway management: deanne-procedural       Induction type:intravenous       Mask difficulty assessment: 1 - vent by mask    Final Airway Details       Final airway type: supraglottic airway    Supraglottic Airway Details        Type: LMA       Brand: Air-Q       LMA size: 4.5    Post intubation assessment        Placement verified by: capnometry, equal breath sounds and chest rise        Number of attempts at approach: 1       Secured with: silk tape       Ease of procedure: easy       Dentition: Intact and Unchanged

## 2022-09-14 NOTE — ANESTHESIA CARE TRANSFER NOTE
Patient: Shannan Burns    Procedure: Procedure(s):  SIMPLE RECESSION OR RESECTION, MUSCLE, EXTRAOCULAR, BILATERAL, FOR STRABISMUS CORRECTION       Diagnosis: Monocular esotropia of left eye [H50.012]  Diplopia [H53.2]  Diagnosis Additional Information: No value filed.    Anesthesia Type:   General     Note:      Level of Consciousness: drowsy  Oxygen Supplementation: face mask  Level of Supplemental Oxygen (L/min / FiO2): 6  Independent Airway: airway patency satisfactory and stable  Dentition: dentition unchanged  Vital Signs Stable: post-procedure vital signs reviewed and stable  Report to RN Given: handoff report given  Patient transferred to: PACU    Handoff Report: Identifed the Patient, Identified the Reponsible Provider, Reviewed the pertinent medical history, Discussed the surgical course, Reviewed Intra-OP anesthesia mangement and issues during anesthesia, Set expectations for post-procedure period and Allowed opportunity for questions and acknowledgement of understanding      Vitals:  Vitals Value Taken Time   BP     Temp     Pulse     Resp     SpO2         Electronically Signed By: DALILA Stephens CRNA  September 14, 2022  4:53 PM

## 2022-09-14 NOTE — BRIEF OP NOTE
[facility]    Brief Operative Note    Pre-operative diagnosis: Monocular esotropia of left eye [H50.012]  Diplopia [H53.2]  Post-operative diagnosis Same as pre-operative diagnosis    Procedure: Procedure(s):  SIMPLE RECESSION OR RESECTION, MUSCLE, EXTRAOCULAR, BILATERAL, FOR STRABISMUS CORRECTION  Surgeon: Surgeon(s) and Role:     * Kourtney Amor MD - Primary   Radha Trent MD - Assistant  Anesthesia: General   Estimated Blood Loss: Minimal    Drains: None  Specimens: * No specimens in log *  Findings:   None.  Complications: None.  Implants: * No implants in log *      Radha Trent MD  Ophthalmology Resident, PGY-3  [facility]

## 2022-09-14 NOTE — ANESTHESIA PREPROCEDURE EVALUATION
Anesthesia Pre-Procedure Evaluation    Patient: Shannan Burns   MRN: [id number] : 1985        Procedure : Procedure(s):  SIMPLE RECESSION OR RESECTION, MUSCLE, EXTRAOCULAR, BILATERAL, FOR STRABISMUS CORRECTION          Past Medical History:   Diagnosis Date     Strabismus       History reviewed. No pertinent surgical history.   No Known Allergies   Social History     Tobacco Use     Smoking status: Never Smoker     Smokeless tobacco: Never Used   Substance Use Topics     Alcohol use: Yes     Comment: socially      Wt Readings from Last 1 Encounters:   22 78.9 kg (174 lb)        Anesthesia Evaluation   Pt has not had prior anesthetic         ROS/MED HX  ENT/Pulmonary:  - neg pulmonary ROS     Neurologic:  - neg neurologic ROS     Cardiovascular:  - neg cardiovascular ROS     METS/Exercise Tolerance:     Hematologic:  - neg hematologic  ROS     Musculoskeletal:  - neg musculoskeletal ROS     GI/Hepatic:  - neg GI/hepatic ROS     Renal/Genitourinary:  - neg Renal ROS     Endo:  - neg endo ROS     Psychiatric/Substance Use:  - neg psychiatric ROS     Infectious Disease:  - neg infectious disease ROS     Malignancy:  - neg malignancy ROS     Other:  - neg other ROS             OUTSIDE LABS:  CBC:   Lab Results   Component Value Date    WBC 8.2 2022    WBC 5.6 2014    HGB 14.5 2022    HGB 13.4 2014    HCT 42.0 2022    HCT 38.2 2014     2022     2014     BMP:   Lab Results   Component Value Date     2022     2014    POTASSIUM 4.4 2022    POTASSIUM 3.3 (L) 2014    CHLORIDE 108 2022    CHLORIDE 103 2014    CO2 26 2022    CO2 26 2014    BUN 14 2022    BUN 10 2014    CR 0.75 2022    CR 0.78 2014    GLC 95 2022     (H) 2014     COAGS: No results found for: PTT, INR, FIBR  POC:   Lab Results   Component Value Date    HCG Negative 2022      HEPATIC: No results found for: ALBUMIN, PROTTOTAL, ALT, AST, GGT, ALKPHOS, BILITOTAL, BILIDIRECT, SWEETIE  OTHER:   Lab Results   Component Value Date    KALYAN 9.0 09/11/2022       Anesthesia Plan    ASA Status:  1   NPO Status:  NPO Appropriate    Anesthesia Type: General.     - Airway: LMA   Induction: Intravenous.   Maintenance: Balanced.        Consents    Anesthesia Plan(s) and associated risks, benefits, and realistic alternatives discussed. Questions answered and patient/representative(s) expressed understanding.     - Discussed: Risks, Benefits and Alternatives for BOTH SEDATION and the PROCEDURE were discussed     - Discussed with:  Patient      - Extended Intubation/Ventilatory Support Discussed: No.      - Patient is DNR/DNI Status: No    Use of blood products discussed: No .     Postoperative Care    Pain management: IV analgesics, Oral pain medications, Multi-modal analgesia.   PONV prophylaxis: Ondansetron (or other 5HT-3), Dexamethasone or Solumedrol, Background Propofol Infusion     Comments:                Andreas Noe

## 2022-09-15 ENCOUNTER — OFFICE VISIT (OUTPATIENT)
Dept: OPHTHALMOLOGY | Facility: CLINIC | Age: 37
End: 2022-09-15
Attending: OPHTHALMOLOGY
Payer: COMMERCIAL

## 2022-09-15 DIAGNOSIS — H53.2 DIPLOPIA: ICD-10-CM

## 2022-09-15 DIAGNOSIS — H53.002 AMBLYOPIA, LEFT EYE: ICD-10-CM

## 2022-09-15 DIAGNOSIS — H50.012 MONOCULAR ESOTROPIA OF LEFT EYE: Primary | ICD-10-CM

## 2022-09-15 PROCEDURE — G0463 HOSPITAL OUTPT CLINIC VISIT: HCPCS | Performed by: TECHNICIAN/TECHNOLOGIST

## 2022-09-15 PROCEDURE — 99024 POSTOP FOLLOW-UP VISIT: CPT | Performed by: OPHTHALMOLOGY

## 2022-09-15 ASSESSMENT — SLIT LAMP EXAM - LIDS
COMMENTS: NORMAL
COMMENTS: NORMAL

## 2022-09-15 ASSESSMENT — VISUAL ACUITY
OS_SC+: -2
METHOD: SNELLEN - LINEAR
OS_SC: 20/50
OD_SC: 20/20

## 2022-09-15 ASSESSMENT — EXTERNAL EXAM - LEFT EYE: OS_EXAM: NORMAL

## 2022-09-15 ASSESSMENT — EXTERNAL EXAM - RIGHT EYE: OD_EXAM: NORMAL

## 2022-09-15 NOTE — NURSING NOTE
Chief Complaint(s) and History of Present Illness(es)     Post Op (Ophthalmology) Left Eye     Laterality: left eye    Comments: Took an Asprin on empty stomach last night and got sick, able to eat okay today, no nausea, took 2 ibuprofen today               Comments     Inf pt

## 2022-09-15 NOTE — PROGRESS NOTES
"Chief Complaints and History of Present Illnesses   Patient presents with     Post Op (Ophthalmology) Left Eye     Took an Asprin on empty stomach last night and got sick, able to eat okay today, no nausea, took 2 ibuprofen today    Review of systems for the eyes was negative other than the pertinent positives and negatives noted in the HPI.  History is obtained from the patient     Referring provider: Referred Self     Primary care: Yazmin Jarquin   Assessment   Shannan Burns is a 37 year old female who presents with:       ICD-10-CM    1. Monocular esotropia of left eye  H50.012    2. Amblyopia, left eye  H53.002    3. Diplopia  H53.2          Miguel Campbell is doing great on POD #1 s/p LMRc 5.5 millimeters.Good alignment and no diplopia.  Call with increasing pain, lid edema and erythema, and discharge.  Tobradex ointment QHS x 1 week.  F/u 3 months.       Further details of the management plan can be found in the \"Patient Instructions\" section which was printed and given to the patient at checkout.  No follow-ups on file.   Attending Physician Attestation:  Complete documentation of historical and exam elements from today's encounter can be found in the full encounter summary report (not reduplicated in this progress note).  I personally obtained the chief complaint(s) and history of present illness.  I confirmed and edited as necessary the review of systems, past medical/surgical history, family history, social history, and examination findings as documented by others; and I examined the patient myself.  I personally reviewed the relevant tests, images, and reports as documented above.  I formulated and edited as necessary the assessment and plan and discussed the findings and management plan with the patient and family. - Kourtney Amor MD 9/15/2022 11:57 AM        "

## 2022-09-27 NOTE — OP NOTE
Procedure Date: 09/14/2022    PREOPERATIVE DIAGNOSES:     1.  Esotropia.  2.  Amblyopia, left eye.    POSTOPERATIVE DIAGNOSES:    1.  Esotropia.  2.  Amblyopia, left eye.    PROCEDURES:   1.  Forced duction testing.  2.  Left medial rectus recession of 5.5 mm.    SURGEON:  Kourtney Amor M.D.    ANESTHESIA:  General.    ESTIMATED BLOOD LOSS:  1 mL    COMPLICATIONS:  None.    INDICATIONS FOR PROCEDURE:  Ms. Burns is a 37-year-old woman who has noticed increasing esotropia and diplopia.  The risks, benefits and alternatives to strabismus repair to restore her binocular alignment and improve her diplopia were discussed including but not limited to infection, bleeding, loss of vision, over or under correction of her alignment, poor cosmesis, need for further surgery.  She elected to proceed.    DESCRIPTION OF PROCEDURE:  After informed consent was obtained, Ms. Burns was taken to the operating room where general anesthesia was induced without complication.  She was prepped and draped in a sterile ophthalmic fashion.  Forced duction testing was performed.  There was trace tightness to abduction in the left eye.  The lid speculum was removed from the right eye and an occluder was placed.  5-0 silk traction suture placed at 6 and 12 o'clock of the limbus of the left eye.  The eye was placed in the abducted position.  A nasal conjunctival peritomy was performed.  The infranasal and supranasal quadrants were dissected.  The left medial rectus muscle was hooked, using small and Marlinton hooks.  The Tenon's was cleaned posterior from the muscle belly.  A 6-0 double-armed Vicryl suture was used to imbricate the muscle at the insertion using central and peripheral locking bites.  Muscle was disinserted from the globe.  Cautery was used for hemostasis.  A caliper was used to measure 5.5 mm posterior to the original insertion.  This was marked with a marking pen.  Scleral passes were performed at these marks and the muscle  was tied down.  An 8-0 Vicryl suture was used to repair the conjunctiva.  The lid speculum and traction sutures were removed from the left eye.  TobraDex ointment was placed in both eyes.  Ms. Burns tolerated the procedure well and went to the recovery room in stable condition.  She will follow up on postoperative day #1 in the eye clinic.    Kourtney Amor MD        D: 2022   T: 2022   MT: CHRISTIAN    Name:     TRINITY BURNS  MRN:      [id number]        Account:        [id number]   :      1985           Procedure Date: 2022     Document: [id number]

## 2023-06-13 ENCOUNTER — OFFICE VISIT (OUTPATIENT)
Dept: URGENT CARE | Facility: URGENT CARE | Age: 38
End: 2023-06-13
Payer: OTHER MISCELLANEOUS

## 2023-06-13 VITALS
DIASTOLIC BLOOD PRESSURE: 90 MMHG | TEMPERATURE: 98.1 F | SYSTOLIC BLOOD PRESSURE: 145 MMHG | HEART RATE: 75 BPM | OXYGEN SATURATION: 100 % | RESPIRATION RATE: 17 BRPM

## 2023-06-13 DIAGNOSIS — Z02.6 ENCOUNTER RELATED TO WORKER'S COMPENSATION CLAIM: ICD-10-CM

## 2023-06-13 DIAGNOSIS — S09.90XA INJURY OF HEAD, INITIAL ENCOUNTER: Primary | ICD-10-CM

## 2023-06-13 DIAGNOSIS — M54.2 NECK PAIN: ICD-10-CM

## 2023-06-13 PROCEDURE — 99213 OFFICE O/P EST LOW 20 MIN: CPT | Performed by: FAMILY MEDICINE

## 2023-06-13 NOTE — PROGRESS NOTES
SUBJECTIVE:   Shannan Burns is a 38 year old female presenting with a chief complaint of   Chief Complaint   Patient presents with     Head Injury     Pt is in clinic to have head eval from injury today at work,      Neck Pain       X-Ray was not done.    ASSESSMENT:  Shannan was seen today for head injury and neck pain.    Diagnoses and all orders for this visit:    Injury of head, initial encounter  -     Concussion  Referral; Future    Encounter related to worker's compensation claim  -     Concussion  Referral; Future    Neck pain        Medical Decision Making:  Shannan Burns is a38 year old female who presents to clinic for evaluation of a head injury.  It was work-related patient is not on blood thinner with no history of loss of consciousness or vision changes and neurological exam within normal no further treatment is needed.  Patient was advised to do Tylenol to help with the pain.   No alterations in mental status or development of new neurologic findings were noted.  Given the above history, examination, and clinic course, I feel outpatient management is reasonable at this time with very close follow-up. Nonetheless, I have recommended close monitoring for symptoms of worsening/severe headache, vomiting, visual changes, numbness/tingling/weakness to extremities, or confusion, which should prompt immediate presentation to the ER.  Closed head injury instructions were discussed including both physical (no running/jumping or heavy physical activities) and cognitive rest until seen in follow-up by their primary care provider.  Patient felt comfortable with this plan of care and questions were answered prior to discharg  Detailed work note was given to patient patient was advised that if the pain persist she should follow-up with the concussion clinic    Differential Diagnosis:  Head InjuryMild head injury, Concussion, Intracranial hemorrhage, Contusion    PLAN:    Head Injury:  Discussed head injury, its evaluation, treatment and possible sequelae, OTC analgesics PRN, Concussion  Referral    Followup:    If not improving or if condition worsens, follow up with your Primary Care Provider, If not improving or if conditions worsens over the next 12-24 hours, go to the Emergency Department    There are no Patient Instructions on file for this visit.      She is an established patient of [facility].    Head Injury    Onset of symptoms was 5.5  hrs  ago.  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    Severity moderate  Current and Associated symptoms: Headache neck pain   Treatment measures tried include: Ibuprofen      Refer to [facility]        Review of Systems    No past medical history on file.  No family history on file.  Current Outpatient Medications   Medication Sig Dispense Refill     ibuprofen (ADVIL/MOTRIN) 200 MG tablet Take 200-400 mg by mouth       Social History     Tobacco Use     Smoking status: Never     Smokeless tobacco: Never   Vaping Use     Vaping status: Never Used   Substance Use Topics     Alcohol use: Yes     Comment: socially       OBJECTIVE  BP (!) 145/90 (BP Location: Right arm, Patient Position: Sitting, Cuff Size: Adult Regular)   Pulse 75   Temp 98.1  F (36.7  C) (Temporal)   Resp 17   SpO2 100%     Physical Exam  gen -alert oriented no acute distress  Eyes pupils equal reactive to light no conjunctival congestion noted  Cranial nerves exam within normal limits  Scalp exam there was no obvious swelling noted in the scalp or any scalp abrasion or laceration noted.  Neurological exam deep tendon reflexes and strength were within normal limits rapid alternating movements within normal limits  Neck range of motion within normal  limits there was definite tenderness in the right side of the neck with no obvious bruising swelling noted and no neck stiffness noted.  S1 and S2 normal, no murmurs, clicks, gallops or rubs. Regular rate and rhythm. Chest is clear; no wheezes or rales. No edema or JVD.  Chest is clear, no wheezing or rales. Normal symmetric air entry throughout both lung fields. No chest wall deformities or tenderness.    Labs:  No results found for this or any previous visit (from the past 24 hour(s)).     Karine Loepz MD

## 2023-06-22 ENCOUNTER — TELEPHONE (OUTPATIENT)
Dept: URGENT CARE | Facility: URGENT CARE | Age: 38
End: 2023-06-22
Payer: COMMERCIAL

## 2023-06-22 NOTE — TELEPHONE ENCOUNTER
Patient Returning Call    Reason for call:  Patient was seen in [facility] for form for work she needs them redone with a time frame with work restrictions     Information relayed to patient:  y    Patient has additional questions:  No      Could we send this information to you [place] or would you prefer to receive a phone call?:   Patient would prefer a phone call   Okay to leave a detailed message?: Yes at Home number on file [phone] (home)

## 2023-06-22 NOTE — LETTER
June 26, 2023      Shannan Burns  [address] [place] [address]        To Whom It May Concern:    Shannan Burns was seen in our clinic on 6/13/23. She may return to work without restrictions on 6/27/23. Please call us with any questions.       Sincerely,        Karine Lopez MD

## 2023-06-26 NOTE — TELEPHONE ENCOUNTER
I spoke with patient and letter written, pt will come . Letter placed at the .  Krista Love, CMA

## 2023-10-01 ENCOUNTER — HEALTH MAINTENANCE LETTER (OUTPATIENT)
Age: 38
End: 2023-10-01

## 2024-11-24 ENCOUNTER — HEALTH MAINTENANCE LETTER (OUTPATIENT)
Age: 39
End: 2024-11-24

## 2025-02-09 ENCOUNTER — HEALTH MAINTENANCE LETTER (OUTPATIENT)
Age: 40
End: 2025-02-09

## (undated) DEVICE — SU VICRYL 6-0 S-29 12" J556G

## (undated) DEVICE — COVER CAMERA IN-LIGHT DISP LT-C02

## (undated) DEVICE — PACK MINOR EYE

## (undated) DEVICE — EYE PREP BETADINE 5% SOLUTION 30ML 0065-0411-30

## (undated) DEVICE — SU VICRYL 8-0 TG140-8DA 12" J548G

## (undated) DEVICE — EYE MARKING PAD 581057

## (undated) DEVICE — LINEN TOWEL PACK X5 5464

## (undated) DEVICE — ESU EYE LOW TEMP 65410-010

## (undated) DEVICE — POSITIONER ARMBOARD FOAM 1PAIR LF FP-ARMB1

## (undated) DEVICE — GLOVE PROTEXIS MICRO 6.5  2D73PM65

## (undated) DEVICE — SU SILK 5-0 TF 18" N266H

## (undated) DEVICE — SOL WATER IRRIG 1000ML BOTTLE 2F7114

## (undated) RX ORDER — ONDANSETRON 4 MG/1
TABLET, ORALLY DISINTEGRATING ORAL
Status: DISPENSED
Start: 2022-09-14

## (undated) RX ORDER — FENTANYL CITRATE 50 UG/ML
INJECTION, SOLUTION INTRAMUSCULAR; INTRAVENOUS
Status: DISPENSED
Start: 2022-09-14

## (undated) RX ORDER — ACETAMINOPHEN 325 MG/1
TABLET ORAL
Status: DISPENSED
Start: 2022-09-14